# Patient Record
Sex: FEMALE | Race: WHITE | NOT HISPANIC OR LATINO | Employment: FULL TIME | URBAN - METROPOLITAN AREA
[De-identification: names, ages, dates, MRNs, and addresses within clinical notes are randomized per-mention and may not be internally consistent; named-entity substitution may affect disease eponyms.]

---

## 2023-09-18 ENCOUNTER — APPOINTMENT (OUTPATIENT)
Dept: LAB | Facility: LAB | Age: 33
End: 2023-09-18
Payer: COMMERCIAL

## 2023-09-18 LAB
DEHYDROEPIANDROSTERONE SULFATE (DHEA-S) (UG/DL) IN SER/: 217 UG/DL (ref 12–379)
ESTRADIOL (PG/ML) IN SER/PLAS: 46 PG/ML
FOLLITROPIN (IU/L) IN SER/PLAS: 6.2 IU/L
LUTEINIZING HORMONE (IU/ML) IN SER/PLAS: 9.5 IU/L
PROLACTIN (UG/L) IN SER/PLAS: 7.9 UG/L (ref 3–20)
THYROTROPIN (MIU/L) IN SER/PLAS BY DETECTION LIMIT <= 0.05 MIU/L: 1.06 MIU/L (ref 0.44–3.98)

## 2023-09-22 LAB
17-HYDROXYPROGESTERONE (REFLAB): 37.39 NG/DL
INSULIN FREE: 12 UIU/ML (ref 3–25)
INSULIN TOTAL: 13 UIU/ML (ref 3–25)
TESTOSTERONE FREE (CHAN): 2.9 PG/ML (ref 0.1–6.4)
TESTOSTERONE,TOTAL,LC-MS/MS: 36 NG/DL (ref 2–45)

## 2024-04-12 ENCOUNTER — APPOINTMENT (OUTPATIENT)
Dept: ENDOCRINOLOGY | Facility: CLINIC | Age: 34
End: 2024-04-12
Payer: COMMERCIAL

## 2024-05-15 ENCOUNTER — APPOINTMENT (OUTPATIENT)
Dept: DERMATOLOGY | Facility: CLINIC | Age: 34
End: 2024-05-15
Payer: COMMERCIAL

## 2024-06-25 NOTE — PROGRESS NOTES
"Jenni Lal female   1990 34 y.o.   [id number]      Chief Complaint  Right breast follow up     History Of Present Illness  Jenni Lal is a very pleasant 34 year old  woman [facility] and right breast discoloration. She reports in 2022 she noticed a palpable \"difference\" in her upper outer quadrant of the right breast shortly before her cycle. She followed up with her GYN who then ordered a mammogram. A right breast mass was noted on ultrasound in 2022 with [facility] recommending a short term follow up. She states in 2022 she noticed a skin discoloration in her lower right breast. She denies trauma to the breast, fever or chills. She denies change in size. She denies pain. She followed up for her recommended ultrasound and was informed she should have a biopsy of the initial mass seen in . She has family history of breast cancer in her paternal grandmother, 80s. She denies breast surgery or tissue biopsy. She had a skin punch biopsy of right breast discoloration on 2022, benign epidermal hyperplasia.      She presents today for recommended follow up. She denies any new masses or lumps. She saw Dermatology for the skin discoloration who performed another skin punch biopsy, results were benign. Following the biopsy, she got a skin infection from the adhesive on the dressing, resolved with oral and topical antibiotics.      BREAST IMAGIN2023  Right breast ultrasound, indicates BI-RADS Category 3. Right breast, previously described mass demonstrates probably benign features warranting follow up in one year.      FEMALE HISTORY: menarche age 14, , first birth age 31,  x 3 months, OCP's x 3 years, premenopausal, extremely dense tissue     FAMILY CANCER HISTORY:  Paternal Grandmother: Breast cancer, 80s  Father: Glioblastoma, age 55      Surgical History  She has a past surgical history that includes Other surgical " history (12/28/2022).     Social History  She reports that she has never smoked. She has never used smokeless tobacco. No history on file for alcohol use and drug use.    Family History  No family history on file.     Allergies  Patient has no known allergies.    Medications  No current outpatient medications      REVIEW OF SYSTEMS    Constitutional:  Negative for appetite change, fatigue, fever and unexpected weight change.   HENT:  Negative for ear pain, hearing loss, nosebleeds, sore throat and trouble swallowing.    Eyes:  Negative for discharge, itching and visual disturbance.   Respiratory:  Negative for cough, chest tightness and shortness of breath.    Cardiovascular:  Negative for chest pain, palpitations and leg swelling.   Breast: as indicated in HPI  Gastrointestinal:  Negative for abdominal pain, constipation, diarrhea and nausea.   Endocrine: Negative for cold intolerance and heat intolerance.   Genitourinary:  Negative for dysuria, frequency, hematuria, pelvic pain and vaginal bleeding.   Musculoskeletal:  Negative for arthralgias, back pain, gait problem, joint swelling and myalgias.   Skin:  Negative for color change and rash.   Allergic/Immunologic: Negative for environmental allergies and food allergies.   Neurological:  Negative for dizziness, tremors, speech difficulty, weakness, numbness and headaches.   Hematological:  Does not bruise/bleed easily.   Psychiatric/Behavioral:  Negative for agitation, dysphoric mood and sleep disturbance. The patient is not nervous/anxious.         Past Medical History  She has no past medical history on file.     Physical Exam  Patient is alert and oriented x3 and in a relaxed and appropriate mood. Her gait is steady and hand grasps are equal. Sclera is clear. The breasts are nearly symmetrical. The tissue is dense in the superior lateral quadrants and softer below without palpable abnormalities, discrete nodules or masses. The left breast skin and both nipples  appear normal. The right breast, 4:00, 3 cm from the nipple, mild circular patch of skin discoloration. There is a visible puncture site at center from previous benign biopsy and a visible superficial 2 cm scar from previous post biopsy infection. There is no cervical, supraclavicular or axillary lymphadenopathy. Heart rate and rhythm normal, S1 and S2 appreciated. The lungs are clear to auscultation bilaterally. Abdomen is soft and non-tender.     Physical Exam     Last Recorded Vitals  Vitals:    07/03/24 0922   BP: 118/70   Pulse: 80       Relevant Results   Time was spent discussing digital images of the radiology testing with the patient. I explained the results in depth, along with suggested explanation for follow up recommendations based on the testing results. BI-RADS Category 3    Imaging  Narrative & Impression   Interpreted By:  Radha Joshi,   STUDY:  BI US BREAST LIMITED RIGHT;  7/3/2024 10:22 am      ACCESSION NUMBER(S):  [id number]      ORDERING CLINICIAN:  DEDRA CUNHA      INDICATION:  Short-term follow-up of a probably benign right breast mass.      COMPARISON:  12/28/2022, 06/28/2023      FINDINGS:  Targeted ultrasound was performed of the right breast by a registered  sonographer with elastography. At the 11 o'clock position 5 cm from  the nipple there is an oval circumscribed parallel hypoechoic mass  with increased through transmission, internal vascularity and soft  elastography characteristics measuring 1.1 x 0.7 x 0.9 cm. This is  similar when compared to prior studies. No suspicious findings are  identified.      IMPRESSION:  Probably benign right breast mass. Final sonographic follow-up is  recommended in six-months.      BI-RADS CATEGORY:  BI-RADS Category:  3 Probably Benign.  Recommendation:  Short-term Interval Follow-up Imaging.  Recommended Date:  6 Months.  Laterality:  Right.       Assessment/Plan   Normal clinical exam and imaging, right breast mass, stable and benign,  right breast skin change, stable and benign, family history of breast cancer, extremely dense tissue     Plan: Return in January 2025 for right breast ultrasound and office visit.     Patient Discussion/Summary  Your clinical examination and imaging are stable. Please return in January 2025 for right breast ultrasound and office visit or sooner if you have any problems or concerns.     You can see your health information, review clinical summaries from office visits & test results online when you follow your health with MY  Chart, a personal health record. To sign up go to [email or web address]. If you need assistance with signing up or trouble getting into your account call [facility] Patient Line 24/7 at [phone].    My office phone number is [phone] if you need to get in touch with me or have additional questions or concerns. Thank you for choosing [facility] and trusting me as your healthcare provider. I look forward to seeing you again at your next office visit. I am honored to be a provider on your health care team and I remain dedicated to helping you achieve your health goals.      Priti Moore, APRN-CNP

## 2024-07-03 ENCOUNTER — OFFICE VISIT (OUTPATIENT)
Dept: SURGICAL ONCOLOGY | Facility: HOSPITAL | Age: 34
End: 2024-07-03
Payer: COMMERCIAL

## 2024-07-03 ENCOUNTER — HOSPITAL ENCOUNTER (OUTPATIENT)
Dept: RADIOLOGY | Facility: HOSPITAL | Age: 34
Discharge: HOME | End: 2024-07-03
Payer: COMMERCIAL

## 2024-07-03 VITALS
HEART RATE: 80 BPM | HEIGHT: 60 IN | BODY MASS INDEX: 28.47 KG/M2 | WEIGHT: 145 LBS | DIASTOLIC BLOOD PRESSURE: 70 MMHG | SYSTOLIC BLOOD PRESSURE: 118 MMHG

## 2024-07-03 DIAGNOSIS — N63.11 MASS OF UPPER OUTER QUADRANT OF RIGHT BREAST: Primary | ICD-10-CM

## 2024-07-03 DIAGNOSIS — R92.8 OTHER ABNORMAL AND INCONCLUSIVE FINDINGS ON DIAGNOSTIC IMAGING OF BREAST: ICD-10-CM

## 2024-07-03 PROCEDURE — 99213 OFFICE O/P EST LOW 20 MIN: CPT | Performed by: NURSE PRACTITIONER

## 2024-07-03 PROCEDURE — 1036F TOBACCO NON-USER: CPT | Performed by: NURSE PRACTITIONER

## 2024-07-03 PROCEDURE — 76982 USE 1ST TARGET LESION: CPT | Mod: RT

## 2024-07-03 PROCEDURE — 76642 ULTRASOUND BREAST LIMITED: CPT | Mod: RT

## 2024-07-03 NOTE — PATIENT INSTRUCTIONS
Your clinical examination and imaging are stable. Please return in January 2025 for right breast ultrasound and office visit or sooner if you have any problems or concerns.     You can see your health information, review clinical summaries from office visits & test results online when you follow your health with MY  Chart, a personal health record. To sign up go to [email or web address]. If you need assistance with signing up or trouble getting into your account call [employer] Patient Line 24/7 at [phone].    My office phone number is [phone] if you need to get in touch with me or have additional questions or concerns. Thank you for choosing [facility] and trusting me as your healthcare provider. I look forward to seeing you again at your next office visit. I am honored to be a provider on your health care team and I remain dedicated to helping you achieve your health goals.

## 2024-09-10 DIAGNOSIS — Z31.83 ENCOUNTER FOR ASSISTED REPRODUCTIVE FERTILITY PROCEDURE CYCLE: Primary | ICD-10-CM

## 2024-09-11 ENCOUNTER — LAB (OUTPATIENT)
Dept: LAB | Facility: LAB | Age: 34
End: 2024-09-11
Payer: COMMERCIAL

## 2024-09-11 DIAGNOSIS — Z31.83 ENCOUNTER FOR ASSISTED REPRODUCTIVE FERTILITY PROCEDURE CYCLE: ICD-10-CM

## 2024-09-11 LAB
B-HCG SERPL-ACNC: <2 MIU/ML
ESTRADIOL SERPL-MCNC: 36 PG/ML
FSH SERPL-ACNC: 5.7 IU/L
LH SERPL-ACNC: 8.3 IU/L
PROGEST SERPL-MCNC: 0.7 NG/ML
TSH SERPL-ACNC: 1.3 MIU/L (ref 0.44–3.98)

## 2024-09-11 PROCEDURE — 84702 CHORIONIC GONADOTROPIN TEST: CPT

## 2024-09-11 PROCEDURE — 84443 ASSAY THYROID STIM HORMONE: CPT

## 2024-09-11 PROCEDURE — 83001 ASSAY OF GONADOTROPIN (FSH): CPT

## 2024-09-11 PROCEDURE — 83002 ASSAY OF GONADOTROPIN (LH): CPT

## 2024-09-11 PROCEDURE — 84144 ASSAY OF PROGESTERONE: CPT

## 2024-09-11 PROCEDURE — 36415 COLL VENOUS BLD VENIPUNCTURE: CPT

## 2024-09-11 PROCEDURE — 82670 ASSAY OF TOTAL ESTRADIOL: CPT

## 2024-09-18 ENCOUNTER — LAB (OUTPATIENT)
Dept: LAB | Facility: LAB | Age: 34
End: 2024-09-18
Payer: COMMERCIAL

## 2024-09-18 DIAGNOSIS — Z31.41 ENCOUNTER FOR FERTILITY TESTING: Primary | ICD-10-CM

## 2024-09-18 LAB
ESTRADIOL SERPL-MCNC: 94 PG/ML
LH SERPL-ACNC: 8.3 IU/L
PROGEST SERPL-MCNC: 0.7 NG/ML

## 2024-09-18 PROCEDURE — 83002 ASSAY OF GONADOTROPIN (LH): CPT

## 2024-09-18 PROCEDURE — 84144 ASSAY OF PROGESTERONE: CPT

## 2024-09-18 PROCEDURE — 36415 COLL VENOUS BLD VENIPUNCTURE: CPT

## 2024-09-18 PROCEDURE — 82670 ASSAY OF TOTAL ESTRADIOL: CPT

## 2024-09-20 ENCOUNTER — LAB (OUTPATIENT)
Dept: LAB | Facility: LAB | Age: 34
End: 2024-09-20
Payer: COMMERCIAL

## 2024-09-20 DIAGNOSIS — Z31.41 ENCOUNTER FOR FERTILITY TESTING: ICD-10-CM

## 2024-09-20 LAB
ESTRADIOL SERPL-MCNC: 601 PG/ML
LH SERPL-ACNC: 8.7 IU/L
PROGEST SERPL-MCNC: 1.4 NG/ML

## 2024-09-20 PROCEDURE — 84144 ASSAY OF PROGESTERONE: CPT

## 2024-09-20 PROCEDURE — 82670 ASSAY OF TOTAL ESTRADIOL: CPT

## 2024-09-20 PROCEDURE — 83002 ASSAY OF GONADOTROPIN (LH): CPT

## 2024-09-23 ENCOUNTER — LAB (OUTPATIENT)
Dept: LAB | Facility: LAB | Age: 34
End: 2024-09-23
Payer: COMMERCIAL

## 2024-09-23 DIAGNOSIS — Z31.41 ENCOUNTER FOR FERTILITY TESTING: ICD-10-CM

## 2024-09-23 LAB
ESTRADIOL SERPL-MCNC: 4230 PG/ML
LH SERPL-ACNC: 6.4 IU/L
PROGEST SERPL-MCNC: 3.3 NG/ML

## 2024-09-23 PROCEDURE — 82670 ASSAY OF TOTAL ESTRADIOL: CPT

## 2024-09-23 PROCEDURE — 84144 ASSAY OF PROGESTERONE: CPT

## 2024-09-23 PROCEDURE — 36415 COLL VENOUS BLD VENIPUNCTURE: CPT

## 2024-09-23 PROCEDURE — 83002 ASSAY OF GONADOTROPIN (LH): CPT

## 2024-11-21 ENCOUNTER — LAB (OUTPATIENT)
Dept: LAB | Facility: LAB | Age: 34
End: 2024-11-21
Payer: COMMERCIAL

## 2024-11-21 DIAGNOSIS — O09.00 SUPERVISION OF PREGNANCY WITH HISTORY OF INFERTILITY, UNSPECIFIED TRIMESTER: Primary | ICD-10-CM

## 2024-11-21 LAB
B-HCG SERPL-ACNC: ABNORMAL MIU/ML
ESTRADIOL SERPL-MCNC: 562 PG/ML
PROGEST SERPL-MCNC: 33.6 NG/ML

## 2024-11-21 PROCEDURE — 36415 COLL VENOUS BLD VENIPUNCTURE: CPT

## 2024-11-21 PROCEDURE — 84702 CHORIONIC GONADOTROPIN TEST: CPT

## 2024-12-05 ENCOUNTER — LAB (OUTPATIENT)
Dept: LAB | Facility: LAB | Age: 34
End: 2024-12-05
Payer: COMMERCIAL

## 2024-12-05 DIAGNOSIS — O09.00 SUPERVISION OF PREGNANCY WITH HISTORY OF INFERTILITY, UNSPECIFIED TRIMESTER: Primary | ICD-10-CM

## 2024-12-05 LAB
B-HCG SERPL-ACNC: ABNORMAL MIU/ML
ESTRADIOL SERPL-MCNC: 974 PG/ML
PROGEST SERPL-MCNC: 60.4 NG/ML

## 2024-12-05 PROCEDURE — 82670 ASSAY OF TOTAL ESTRADIOL: CPT

## 2024-12-05 PROCEDURE — 84144 ASSAY OF PROGESTERONE: CPT

## 2024-12-05 PROCEDURE — 84702 CHORIONIC GONADOTROPIN TEST: CPT

## 2025-01-08 NOTE — PROGRESS NOTES
"Jenni Lal female   1990 35 y.o.   [id number]      Chief Complaint  Right breast follow up     History Of Present Illness  Jenni Lal is a very pleasant 35 year old  woman [facility] and right breast discoloration. She reports in 2022 she noticed a palpable \"difference\" in her upper outer quadrant of the right breast shortly before her cycle. She followed up with her GYN who then ordered a mammogram. A right breast mass was noted on ultrasound in 2022 with [facility] recommending a short term follow up. She states in 2022 she noticed a skin discoloration in her lower right breast. She denies trauma to the breast, fever or chills. She denies change in size. She denies pain. She followed up for her recommended ultrasound and was informed she should have a biopsy of the initial mass seen in . She has family history of breast cancer in her paternal grandmother, 80s. She denies breast surgery or tissue biopsy. She had a skin punch biopsy of right breast discoloration on 2022, benign epidermal hyperplasia.      She presents today for recommended follow up. She denies any new masses or lumps. She saw Dermatology for the skin discoloration who performed another skin punch biopsy, results were benign. Following the biopsy, she got a skin infection from the adhesive on the dressing, resolved with oral and topical antibiotics.      BREAST IMAGIN/3/2024 Right breast ultrasound, indicates BI-RADS Category 3. Right breast, previously described mass demonstrates probably benign features warranting final follow up in 6 months.       FEMALE HISTORY: menarche age 14, , first birth age 31,  x 3 months, OCP's x 3 years, premenopausal, extremely dense tissue     FAMILY CANCER HISTORY:  Paternal Grandmother: Breast cancer, 80s  Father: Glioblastoma, age 55      Surgical History  She has a past surgical history that includes Other surgical " history (12/28/2022).     Social History  She reports that she has never smoked. She has never used smokeless tobacco. No history on file for alcohol use and drug use.    Family History  No family history on file.     Allergies  Patient has no known allergies.    Medications  No current outpatient medications      REVIEW OF SYSTEMS    Constitutional:  Negative for appetite change, fatigue, fever and unexpected weight change.   HENT:  Negative for ear pain, hearing loss, nosebleeds, sore throat and trouble swallowing.    Eyes:  Negative for discharge, itching and visual disturbance.   Respiratory:  Negative for cough, chest tightness and shortness of breath.    Cardiovascular:  Negative for chest pain, palpitations and leg swelling.   Breast: as indicated in HPI  Gastrointestinal:  Negative for abdominal pain, constipation, diarrhea and nausea.   Endocrine: Negative for cold intolerance and heat intolerance.   Genitourinary:  Negative for dysuria, frequency, hematuria, pelvic pain and vaginal bleeding.   Musculoskeletal:  Negative for arthralgias, back pain, gait problem, joint swelling and myalgias.   Skin:  Negative for color change and rash.   Allergic/Immunologic: Negative for environmental allergies and food allergies.   Neurological:  Negative for dizziness, tremors, speech difficulty, weakness, numbness and headaches.   Hematological:  Does not bruise/bleed easily.   Psychiatric/Behavioral:  Negative for agitation, dysphoric mood and sleep disturbance. The patient is not nervous/anxious.         Past Medical History  She has no past medical history on file.     Physical Exam  Patient is alert and oriented x3 and in a relaxed and appropriate mood. Her gait is steady and hand grasps are equal. Sclera is clear. The breasts are nearly symmetrical. The tissue is dense in the superior lateral quadrants and softer below without palpable abnormalities, discrete nodules or masses. The left breast skin and both nipples  appear normal. The right breast, 4:00, 3 cm from the nipple, mild circular patch of skin discoloration. There is a visible puncture site at center from previous benign biopsy and a visible superficial 2 cm scar from previous post biopsy infection. There is no cervical, supraclavicular or axillary lymphadenopathy. Heart rate and rhythm normal, S1 and S2 appreciated. The lungs are clear to auscultation bilaterally. Abdomen is soft and non-tender.     Physical Exam     Last Recorded Vitals  There were no vitals filed for this visit.      Relevant Results   Time was spent discussing digital images of the radiology testing with the patient. I explained the results in depth, along with suggested explanation for follow up recommendations based on the testing results. BI-RADS Category     Imaging      Assessment/Plan   Normal clinical exam and imaging, right breast mass, stable and benign, right breast skin change, stable and benign, family history of breast cancer, extremely dense tissue     Plan: Return to PCP for annual breast exams. Resume annual mammograms at age 41 unless otherwise recommended.      Patient Discussion/Summary  Your clinical examination and imaging are normal. You no longer need to be seen by a breast specialist for an annual physical breast examination. It is important to continue annual screening mammograms and breast exams through your primary care provider. Please return to see me if you have a new breast problem or abnormal mammogram. It has been a pleasure having you as a patient.     You can see your health information, review clinical summaries from office visits & test results online when you follow your health with MY  Chart, a personal health record. To sign up go to [email or web address]. If you need assistance with signing up or trouble getting into your account call [employer] Patient Line 24/7 at [phone].    My office phone number is [phone] if you need to get in touch  with me or have additional questions or concerns. Thank you for choosing [facility] and trusting me as your healthcare provider. I am honored to be a provider on your health care team and I remain dedicated to helping you achieve your health goals.       Priti Moore, APRN-CNP

## 2025-01-14 ENCOUNTER — APPOINTMENT (OUTPATIENT)
Dept: RADIOLOGY | Facility: HOSPITAL | Age: 35
End: 2025-01-14
Payer: COMMERCIAL

## 2025-01-14 ENCOUNTER — APPOINTMENT (OUTPATIENT)
Dept: SURGICAL ONCOLOGY | Facility: HOSPITAL | Age: 35
End: 2025-01-14
Payer: COMMERCIAL